# Patient Record
Sex: FEMALE | Race: BLACK OR AFRICAN AMERICAN | NOT HISPANIC OR LATINO
[De-identification: names, ages, dates, MRNs, and addresses within clinical notes are randomized per-mention and may not be internally consistent; named-entity substitution may affect disease eponyms.]

---

## 2017-04-28 ENCOUNTER — APPOINTMENT (OUTPATIENT)
Dept: INTERNAL MEDICINE | Facility: CLINIC | Age: 42
End: 2017-04-28

## 2017-04-28 VITALS
DIASTOLIC BLOOD PRESSURE: 90 MMHG | WEIGHT: 150 LBS | BODY MASS INDEX: 23.54 KG/M2 | OXYGEN SATURATION: 98 % | HEIGHT: 67 IN | SYSTOLIC BLOOD PRESSURE: 150 MMHG | TEMPERATURE: 97.8 F | HEART RATE: 91 BPM

## 2017-05-02 ENCOUNTER — MEDICATION RENEWAL (OUTPATIENT)
Age: 42
End: 2017-05-02

## 2017-05-04 LAB — HBA1C MFR BLD HPLC: 11.5 %

## 2017-05-26 ENCOUNTER — LABORATORY RESULT (OUTPATIENT)
Age: 42
End: 2017-05-26

## 2017-05-26 ENCOUNTER — APPOINTMENT (OUTPATIENT)
Dept: INTERNAL MEDICINE | Facility: CLINIC | Age: 42
End: 2017-05-26

## 2017-05-26 VITALS
HEART RATE: 110 BPM | WEIGHT: 150 LBS | BODY MASS INDEX: 23.54 KG/M2 | HEIGHT: 67 IN | DIASTOLIC BLOOD PRESSURE: 100 MMHG | TEMPERATURE: 98.7 F | SYSTOLIC BLOOD PRESSURE: 172 MMHG | OXYGEN SATURATION: 98 %

## 2017-05-26 VITALS — DIASTOLIC BLOOD PRESSURE: 92 MMHG | SYSTOLIC BLOOD PRESSURE: 160 MMHG

## 2017-05-26 DIAGNOSIS — R03.0 ELEVATED BLOOD-PRESSURE READING, W/OUT DIAGNOSIS OF HYPERTENSION: ICD-10-CM

## 2017-05-30 ENCOUNTER — CLINICAL ADVICE (OUTPATIENT)
Age: 42
End: 2017-05-30

## 2017-06-07 LAB
ALBUMIN SERPL ELPH-MCNC: 4.2 G/DL
ALP BLD-CCNC: 70 U/L
ALT SERPL-CCNC: 81 U/L
ANION GAP SERPL CALC-SCNC: 21 MMOL/L
AST SERPL-CCNC: 61 U/L
BASOPHILS # BLD AUTO: 0.02 K/UL
BASOPHILS NFR BLD AUTO: 0.3 %
BILIRUB SERPL-MCNC: 0.2 MG/DL
BUN SERPL-MCNC: 10 MG/DL
CALCIUM SERPL-MCNC: 9.8 MG/DL
CHLORIDE SERPL-SCNC: 93 MMOL/L
CHOLEST SERPL-MCNC: 492 MG/DL
CHOLEST/HDLC SERPL: 25.9 RATIO
CO2 SERPL-SCNC: 17 MMOL/L
CREAT SERPL-MCNC: 0.72 MG/DL
EOSINOPHIL # BLD AUTO: 0.12 K/UL
EOSINOPHIL NFR BLD AUTO: 1.9 %
GLUCOSE SERPL-MCNC: 132 MG/DL
HCT VFR BLD CALC: 36.6 %
HDLC SERPL-MCNC: 19 MG/DL
HGB BLD-MCNC: 12.7 G/DL
IMM GRANULOCYTES NFR BLD AUTO: 0.3 %
LDLC SERPL CALC-MCNC: NORMAL
LYMPHOCYTES # BLD AUTO: 2.2 K/UL
LYMPHOCYTES NFR BLD AUTO: 35.5 %
MAN DIFF?: NORMAL
MCHC RBC-ENTMCNC: 29.3 PG
MCHC RBC-ENTMCNC: 34.7 GM/DL
MCV RBC AUTO: 84.3 FL
MEV IGG FLD QL IA: >300 AU/ML
MEV IGG+IGM SER-IMP: POSITIVE
MONOCYTES # BLD AUTO: 0.45 K/UL
MONOCYTES NFR BLD AUTO: 7.3 %
MUV AB SER-ACNC: NORMAL
MUV IGG SER QL IA: 10.8 AU/ML
NEUTROPHILS # BLD AUTO: 3.39 K/UL
NEUTROPHILS NFR BLD AUTO: 54.7 %
PLATELET # BLD AUTO: 347 K/UL
POTASSIUM SERPL-SCNC: 3.6 MMOL/L
PROT SERPL-MCNC: 7.2 G/DL
RBC # BLD: 4.34 M/UL
RBC # FLD: 15.5 %
RUBV IGG FLD-ACNC: 5 INDEX
RUBV IGG SER-IMP: POSITIVE
SODIUM SERPL-SCNC: 131 MMOL/L
TRIGL SERPL-MCNC: 4650 MG/DL
TSH SERPL-ACNC: 2.98 UIU/ML
WBC # FLD AUTO: 6.2 K/UL

## 2017-06-16 ENCOUNTER — APPOINTMENT (OUTPATIENT)
Dept: INTERNAL MEDICINE | Facility: CLINIC | Age: 42
End: 2017-06-16

## 2017-06-16 VITALS
HEIGHT: 67 IN | BODY MASS INDEX: 23.54 KG/M2 | TEMPERATURE: 98.4 F | WEIGHT: 150 LBS | HEART RATE: 100 BPM | SYSTOLIC BLOOD PRESSURE: 142 MMHG | DIASTOLIC BLOOD PRESSURE: 90 MMHG | OXYGEN SATURATION: 98 %

## 2017-06-16 VITALS — DIASTOLIC BLOOD PRESSURE: 90 MMHG | SYSTOLIC BLOOD PRESSURE: 132 MMHG

## 2017-07-07 ENCOUNTER — RX RENEWAL (OUTPATIENT)
Age: 42
End: 2017-07-07

## 2017-07-07 ENCOUNTER — APPOINTMENT (OUTPATIENT)
Dept: INTERNAL MEDICINE | Facility: CLINIC | Age: 42
End: 2017-07-07

## 2017-07-07 VITALS — DIASTOLIC BLOOD PRESSURE: 80 MMHG | SYSTOLIC BLOOD PRESSURE: 122 MMHG

## 2017-07-07 VITALS
OXYGEN SATURATION: 98 % | WEIGHT: 149 LBS | HEIGHT: 67 IN | SYSTOLIC BLOOD PRESSURE: 130 MMHG | DIASTOLIC BLOOD PRESSURE: 90 MMHG | BODY MASS INDEX: 23.39 KG/M2 | TEMPERATURE: 98.7 F | HEART RATE: 97 BPM

## 2017-07-07 LAB
ANION GAP SERPL CALC-SCNC: 25 MMOL/L
BUN SERPL-MCNC: 16 MG/DL
CALCIUM SERPL-MCNC: 9.6 MG/DL
CHLORIDE SERPL-SCNC: 91 MMOL/L
CHOLEST SERPL-MCNC: 380 MG/DL
CHOLEST/HDLC SERPL: 29.2 RATIO
CO2 SERPL-SCNC: 14 MMOL/L
CREAT SERPL-MCNC: 0.64 MG/DL
GLUCOSE SERPL-MCNC: 297 MG/DL
HBA1C MFR BLD HPLC: 11 %
HDLC SERPL-MCNC: 13 MG/DL
LDLC SERPL CALC-MCNC: NORMAL
POTASSIUM SERPL-SCNC: 4.3 MMOL/L
SODIUM SERPL-SCNC: 130 MMOL/L
TRIGL SERPL-MCNC: 3830 MG/DL

## 2017-09-13 ENCOUNTER — MOBILE ON CALL (OUTPATIENT)
Age: 42
End: 2017-09-13

## 2017-10-07 ENCOUNTER — MOBILE ON CALL (OUTPATIENT)
Age: 42
End: 2017-10-07

## 2017-11-03 ENCOUNTER — APPOINTMENT (OUTPATIENT)
Dept: INTERNAL MEDICINE | Facility: CLINIC | Age: 42
End: 2017-11-03
Payer: COMMERCIAL

## 2017-11-03 VITALS
HEART RATE: 93 BPM | WEIGHT: 149 LBS | DIASTOLIC BLOOD PRESSURE: 83 MMHG | OXYGEN SATURATION: 98 % | SYSTOLIC BLOOD PRESSURE: 155 MMHG | BODY MASS INDEX: 23.39 KG/M2 | TEMPERATURE: 98.3 F | HEIGHT: 67 IN

## 2017-11-03 VITALS — DIASTOLIC BLOOD PRESSURE: 80 MMHG | SYSTOLIC BLOOD PRESSURE: 132 MMHG

## 2017-11-03 PROCEDURE — G0008: CPT

## 2017-11-03 PROCEDURE — 90686 IIV4 VACC NO PRSV 0.5 ML IM: CPT

## 2017-11-03 PROCEDURE — 99214 OFFICE O/P EST MOD 30 MIN: CPT | Mod: 25

## 2017-11-03 PROCEDURE — 36415 COLL VENOUS BLD VENIPUNCTURE: CPT

## 2017-11-03 RX ORDER — GLIPIZIDE 2.5 MG/1
2.5 TABLET, FILM COATED, EXTENDED RELEASE ORAL
Qty: 90 | Refills: 0 | Status: DISCONTINUED | COMMUNITY
Start: 2017-05-02

## 2017-11-03 RX ORDER — GLIPIZIDE 5 MG/1
5 TABLET, FILM COATED, EXTENDED RELEASE ORAL DAILY
Qty: 30 | Refills: 2 | Status: DISCONTINUED | COMMUNITY
Start: 2017-05-02 | End: 2017-11-03

## 2017-11-04 ENCOUNTER — MOBILE ON CALL (OUTPATIENT)
Age: 42
End: 2017-11-04

## 2017-11-08 LAB
25(OH)D3 SERPL-MCNC: 7.9 NG/ML
ANION GAP SERPL CALC-SCNC: 18 MMOL/L
BUN SERPL-MCNC: 13 MG/DL
CALCIUM SERPL-MCNC: 9.8 MG/DL
CHLORIDE SERPL-SCNC: 101 MMOL/L
CHOLEST SERPL-MCNC: 213 MG/DL
CHOLEST/HDLC SERPL: 6.5 RATIO
CO2 SERPL-SCNC: 20 MMOL/L
CREAT SERPL-MCNC: 0.65 MG/DL
GLUCOSE SERPL-MCNC: 226 MG/DL
HBA1C MFR BLD HPLC: 10.6 %
HDLC SERPL-MCNC: 33 MG/DL
LDLC SERPL CALC-MCNC: NORMAL
POTASSIUM SERPL-SCNC: 4.1 MMOL/L
SODIUM SERPL-SCNC: 139 MMOL/L
TRIGL SERPL-MCNC: 730 MG/DL

## 2018-03-02 ENCOUNTER — APPOINTMENT (OUTPATIENT)
Dept: INTERNAL MEDICINE | Facility: CLINIC | Age: 43
End: 2018-03-02

## 2018-03-26 ENCOUNTER — MEDICATION RENEWAL (OUTPATIENT)
Age: 43
End: 2018-03-26

## 2018-05-04 ENCOUNTER — APPOINTMENT (OUTPATIENT)
Dept: INTERNAL MEDICINE | Facility: CLINIC | Age: 43
End: 2018-05-04
Payer: SELF-PAY

## 2018-05-04 VITALS
WEIGHT: 145 LBS | HEIGHT: 67 IN | SYSTOLIC BLOOD PRESSURE: 140 MMHG | DIASTOLIC BLOOD PRESSURE: 90 MMHG | TEMPERATURE: 98.5 F | BODY MASS INDEX: 22.76 KG/M2 | OXYGEN SATURATION: 99 % | HEART RATE: 102 BPM

## 2018-05-04 VITALS — SYSTOLIC BLOOD PRESSURE: 148 MMHG | DIASTOLIC BLOOD PRESSURE: 80 MMHG

## 2018-05-04 PROCEDURE — 36415 COLL VENOUS BLD VENIPUNCTURE: CPT

## 2018-05-04 PROCEDURE — 99396 PREV VISIT EST AGE 40-64: CPT | Mod: 25

## 2018-05-04 PROCEDURE — 82962 GLUCOSE BLOOD TEST: CPT

## 2018-05-04 PROCEDURE — 93000 ELECTROCARDIOGRAM COMPLETE: CPT

## 2018-05-06 ENCOUNTER — MOBILE ON CALL (OUTPATIENT)
Age: 43
End: 2018-05-06

## 2018-05-11 ENCOUNTER — RX RENEWAL (OUTPATIENT)
Age: 43
End: 2018-05-11

## 2018-05-11 ENCOUNTER — MEDICATION RENEWAL (OUTPATIENT)
Age: 43
End: 2018-05-11

## 2018-06-07 LAB
25(OH)D3 SERPL-MCNC: 7.6 NG/ML
ALBUMIN SERPL ELPH-MCNC: 4.3 G/DL
ALP BLD-CCNC: 71 U/L
ALT SERPL-CCNC: 30 U/L
ANION GAP SERPL CALC-SCNC: 24 MMOL/L
AST SERPL-CCNC: 40 U/L
BASOPHILS # BLD AUTO: 0.01 K/UL
BASOPHILS NFR BLD AUTO: 0.2 %
BILIRUB SERPL-MCNC: 0.4 MG/DL
BUN SERPL-MCNC: 13 MG/DL
CALCIUM SERPL-MCNC: 9.2 MG/DL
CHLORIDE SERPL-SCNC: 97 MMOL/L
CHOLEST SERPL-MCNC: 206 MG/DL
CHOLEST/HDLC SERPL: 6.2 RATIO
CO2 SERPL-SCNC: 14 MMOL/L
CREAT SERPL-MCNC: 0.53 MG/DL
EOSINOPHIL # BLD AUTO: 0.08 K/UL
EOSINOPHIL NFR BLD AUTO: 1.4 %
GLUCOSE SERPL-MCNC: 136 MG/DL
HCT VFR BLD CALC: 38.3 %
HDLC SERPL-MCNC: 33 MG/DL
HGB BLD-MCNC: 12.3 G/DL
IMM GRANULOCYTES NFR BLD AUTO: 0.2 %
LDLC SERPL CALC-MCNC: NORMAL
LYMPHOCYTES # BLD AUTO: 2.37 K/UL
LYMPHOCYTES NFR BLD AUTO: 41.8 %
MAN DIFF?: NORMAL
MCHC RBC-ENTMCNC: 27 PG
MCHC RBC-ENTMCNC: 32.1 GM/DL
MCV RBC AUTO: 84 FL
MONOCYTES # BLD AUTO: 0.44 K/UL
MONOCYTES NFR BLD AUTO: 7.8 %
NEUTROPHILS # BLD AUTO: 2.76 K/UL
NEUTROPHILS NFR BLD AUTO: 48.6 %
PLATELET # BLD AUTO: 315 K/UL
POTASSIUM SERPL-SCNC: 4.4 MMOL/L
PROT SERPL-MCNC: 7.9 G/DL
RBC # BLD: 4.56 M/UL
RBC # FLD: 15.3 %
SODIUM SERPL-SCNC: 135 MMOL/L
TRIGL SERPL-MCNC: 642 MG/DL
TSH SERPL-ACNC: 2.46 UIU/ML
TTG IGA SER IA-ACNC: 6.2 UNITS
TTG IGA SER-ACNC: NEGATIVE
WBC # FLD AUTO: 5.67 K/UL

## 2018-09-21 ENCOUNTER — RX RENEWAL (OUTPATIENT)
Age: 43
End: 2018-09-21

## 2018-09-21 ENCOUNTER — APPOINTMENT (OUTPATIENT)
Dept: INTERNAL MEDICINE | Facility: CLINIC | Age: 43
End: 2018-09-21

## 2018-09-21 ENCOUNTER — OTHER (OUTPATIENT)
Age: 43
End: 2018-09-21

## 2018-09-21 ENCOUNTER — MEDICATION RENEWAL (OUTPATIENT)
Age: 43
End: 2018-09-21

## 2019-03-22 ENCOUNTER — MEDICATION RENEWAL (OUTPATIENT)
Age: 44
End: 2019-03-22

## 2019-12-06 ENCOUNTER — APPOINTMENT (OUTPATIENT)
Dept: INTERNAL MEDICINE | Facility: CLINIC | Age: 44
End: 2019-12-06

## 2020-07-10 ENCOUNTER — RX RENEWAL (OUTPATIENT)
Age: 45
End: 2020-07-10

## 2020-07-10 ENCOUNTER — NON-APPOINTMENT (OUTPATIENT)
Age: 45
End: 2020-07-10

## 2020-07-10 ENCOUNTER — APPOINTMENT (OUTPATIENT)
Dept: INTERNAL MEDICINE | Facility: CLINIC | Age: 45
End: 2020-07-10
Payer: COMMERCIAL

## 2020-07-10 VITALS
WEIGHT: 124 LBS | HEART RATE: 113 BPM | DIASTOLIC BLOOD PRESSURE: 80 MMHG | TEMPERATURE: 97.9 F | HEIGHT: 64 IN | OXYGEN SATURATION: 98 % | SYSTOLIC BLOOD PRESSURE: 150 MMHG | BODY MASS INDEX: 21.17 KG/M2

## 2020-07-10 VITALS — SYSTOLIC BLOOD PRESSURE: 130 MMHG | DIASTOLIC BLOOD PRESSURE: 80 MMHG

## 2020-07-10 DIAGNOSIS — H00.019 HORDEOLUM EXTERNUM UNSPECIFIED EYE, UNSPECIFIED EYELID: ICD-10-CM

## 2020-07-10 DIAGNOSIS — Z3A.15 15 WEEKS GESTATION OF PREGNANCY: ICD-10-CM

## 2020-07-10 DIAGNOSIS — H61.23 IMPACTED CERUMEN, BILATERAL: ICD-10-CM

## 2020-07-10 DIAGNOSIS — Z11.59 ENCOUNTER FOR SCREENING FOR OTHER VIRAL DISEASES: ICD-10-CM

## 2020-07-10 DIAGNOSIS — Z87.898 PERSONAL HISTORY OF OTHER SPECIFIED CONDITIONS: ICD-10-CM

## 2020-07-10 DIAGNOSIS — E55.9 VITAMIN D DEFICIENCY, UNSPECIFIED: ICD-10-CM

## 2020-07-10 PROCEDURE — 93000 ELECTROCARDIOGRAM COMPLETE: CPT

## 2020-07-10 PROCEDURE — 99396 PREV VISIT EST AGE 40-64: CPT | Mod: 25

## 2020-07-10 PROCEDURE — 36415 COLL VENOUS BLD VENIPUNCTURE: CPT

## 2020-07-10 RX ORDER — FLASH GLUCOSE SCANNING READER
EACH MISCELLANEOUS
Qty: 1 | Refills: 0 | Status: ACTIVE | COMMUNITY
Start: 2020-07-10 | End: 1900-01-01

## 2020-07-10 RX ORDER — FLASH GLUCOSE SENSOR
KIT MISCELLANEOUS
Qty: 2 | Refills: 6 | Status: ACTIVE | COMMUNITY
Start: 2020-07-10 | End: 1900-01-01

## 2020-07-10 NOTE — PAST MEDICAL HISTORY
[Menstruating] : menstruating [Regular Cycle Intervals] : have been regular [AB Spont ___] : miscarriages: [unfilled]  [Total Preg ___] : G[unfilled]

## 2020-07-10 NOTE — HEALTH RISK ASSESSMENT
[Good] : ~his/her~ current health as good [] : No [No] : No [One fall no injury in past year] : Patient reported one fall in the past year without injury [Audit-CScore] : 0 [0] : 2) Feeling down, depressed, or hopeless: Not at all (0) [de-identified] : Walking [de-identified] : Appetite secondary to pain [[id number]] : 0 [Language] : denies difficulty with language [Change in mental status noted] : No change in mental status noted [Patient reported bone density results were normal] : Patient reported bone density results were normal [Handling Complex Tasks] : denies difficulty handling complex tasks [Learning/Retaining New Information] : denies difficulty learning/retaining new information [Behavior] : denies difficulty with behavior [Spatial Ability and Orientation] : denies difficulty with spatial ability and orientation [Reasoning] : denies difficulty with reasoning [None] : None [Employed] : employed [Single] : single [Alone] : lives alone [High Risk Behavior] : no high risk behavior [Feels Safe at Home] : Feels safe at home [Sexually Active] : not sexually active [Fully functional (using the telephone, shopping, preparing meals, housekeeping, doing laundry, using] : Fully functional and needs no help or supervision to perform IADLs (using the telephone, shopping, preparing meals, housekeeping, doing laundry, using transportation, managing medications and managing finances) [Fully functional (bathing, dressing, toileting, transferring, walking, feeding)] : Fully functional (bathing, dressing, toileting, transferring, walking, feeding) [Reports normal functional visual acuity (ie: able to read med bottle)] : Reports normal functional visual acuity [Reports changes in hearing] : Reports no changes in hearing [Reports changes in vision] : Reports no changes in vision [Carbon Monoxide Detector] : carbon monoxide detector [Smoke Detector] : smoke detector [Reports changes in dental health] : Reports no changes in dental health [Guns at Home] : no guns at home [Safety elements used in home] : safety elements used in home [Seat Belt] :  uses seat belt [Travel to Developing Areas] : does not  travel to developing areas [Sunscreen] : uses sunscreen [TB Exposure] : is not being exposed to tuberculosis [PapSmearDate] : 2018 [Caregiver Concerns] : does not have caregiver concerns

## 2020-07-10 NOTE — PHYSICAL EXAM
[Normal Outer Ear/Nose] : the outer ears and nose were normal in appearance [Normal Sclera/Conjunctiva] : normal sclera/conjunctiva [No Joint Swelling] : no joint swelling [Grossly Normal Strength/Tone] : grossly normal strength/tone [de-identified] :  exophthalmia [Normal] : normal gait, coordination grossly intact, no focal deficits and deep tendon reflexes were 2+ and symmetric [de-identified] : Strength 4 to 5 on the right sensory grossly intact positive straight leg raise on the right

## 2020-07-10 NOTE — ASSESSMENT
[FreeTextEntry1] : Patient is a 44-year-old female with history of type II diabetes, hypertension, exophthalmia, nonalcoholic fatty liver, thyroid nodules, thyroid medically who presents for comprehensive annual physical examination complains of weight loss and severe lower back pain\par \par 1 chronic lower back pain with right sided sciatic\par increase Advil 21 tablet three times a day and started at gabapentin 300 MG QHS then taper up to if no sedation Tid.\par Schedule MRI\par consider referral to back surgery\par \par 2. Weight loss\par patient claim secondary to pain consider CT abdomen and pelvis chest\par start with mammogram labs.\par \par 3 type II diabetes\par continue metformin and glipizide check hemoglobin A1c kidney function\par \par 4. Hypertriglyceridemia\par continue medication check lipid profile\par \par 5 sinus tachycardia\par secondary to pain check TSH LVH on EKG\par \par 6 hypertension\par continue amlodipine 5 mg, hydrochlorothiazide 12.5 mg Q day\par \par 7 vitamin D deficiency\par continue vitamin D as needed today check vitamin D level\par \par 8 health maintenance\par referral to ophthalmology\par referral for mammogram\par check COVID antibody\par check routine labs\par \par 9. Nonalcoholic fatty liver\par check LFTs\par \par

## 2020-07-10 NOTE — REVIEW OF SYSTEMS
[Joint Swelling] : no joint swelling [Joint Pain] : no joint pain [Joint Stiffness] : no joint stiffness [Muscle Pain] : no muscle pain [Muscle Weakness] : muscle weakness [Back Pain] : back pain [Negative] : Heme/Lymph

## 2020-07-10 NOTE — HISTORY OF PRESENT ILLNESS
[FreeTextEntry1] : Comprehensive annual physical examination follow-up for diabetes and acute on chronic right sided low back pain weight loss [de-identified] : Patient is a 44-year-old -American female with history of type II diabetes, hypertension, vitamin D disease, nonalcoholic fatty liver disease, thyroid nodules, hypertriglyceridemia lower back pain who presents for comprehensive annual physical examination. Patient complains of severe right sided lower back pain radiating down right leg with weakness patient finds minimal relief with Advil one tablet twice a day denies incontinence, saddle numbness, incontinence. Patient notes well-controlled sugars denies headache, dizziness, chest pain.

## 2020-07-17 LAB
25(OH)D3 SERPL-MCNC: 25.6 NG/ML
ALBUMIN SERPL ELPH-MCNC: 4.7 G/DL
ALP BLD-CCNC: 59 U/L
ALT SERPL-CCNC: 35 U/L
ANION GAP SERPL CALC-SCNC: 21 MMOL/L
AST SERPL-CCNC: 28 U/L
BASOPHILS # BLD AUTO: 0.02 K/UL
BASOPHILS NFR BLD AUTO: 0.3 %
BILIRUB SERPL-MCNC: 0.4 MG/DL
BUN SERPL-MCNC: 23 MG/DL
CALCIUM SERPL-MCNC: 10.2 MG/DL
CHLORIDE SERPL-SCNC: 99 MMOL/L
CHOLEST SERPL-MCNC: 257 MG/DL
CHOLEST/HDLC SERPL: 7 RATIO
CO2 SERPL-SCNC: 20 MMOL/L
CREAT SERPL-MCNC: 1.05 MG/DL
EOSINOPHIL # BLD AUTO: 0.04 K/UL
EOSINOPHIL NFR BLD AUTO: 0.7 %
ESTIMATED AVERAGE GLUCOSE: 200 MG/DL
GLUCOSE SERPL-MCNC: 110 MG/DL
HBA1C MFR BLD HPLC: 8.6 %
HCT VFR BLD CALC: 39.7 %
HDLC SERPL-MCNC: 37 MG/DL
HGB BLD-MCNC: 12.7 G/DL
IMM GRANULOCYTES NFR BLD AUTO: 0.2 %
LDLC SERPL CALC-MCNC: 184 MG/DL
LYMPHOCYTES # BLD AUTO: 3.19 K/UL
LYMPHOCYTES NFR BLD AUTO: 53.4 %
MAN DIFF?: NORMAL
MCHC RBC-ENTMCNC: 27 PG
MCHC RBC-ENTMCNC: 32 GM/DL
MCV RBC AUTO: 84.5 FL
MONOCYTES # BLD AUTO: 0.43 K/UL
MONOCYTES NFR BLD AUTO: 7.2 %
NEUTROPHILS # BLD AUTO: 2.28 K/UL
NEUTROPHILS NFR BLD AUTO: 38.2 %
PLATELET # BLD AUTO: 280 K/UL
POTASSIUM SERPL-SCNC: 3.9 MMOL/L
PROT SERPL-MCNC: 7.4 G/DL
RBC # BLD: 4.7 M/UL
RBC # FLD: 15.4 %
SARS-COV-2 IGG SERPL IA-ACNC: 0.01 INDEX
SARS-COV-2 IGG SERPL QL IA: NEGATIVE
SODIUM SERPL-SCNC: 140 MMOL/L
TRIGL SERPL-MCNC: 186 MG/DL
TSH SERPL-ACNC: 2.22 UIU/ML
WBC # FLD AUTO: 5.97 K/UL

## 2020-07-24 ENCOUNTER — RX RENEWAL (OUTPATIENT)
Age: 45
End: 2020-07-24

## 2020-07-24 ENCOUNTER — APPOINTMENT (OUTPATIENT)
Dept: INTERNAL MEDICINE | Facility: CLINIC | Age: 45
End: 2020-07-24
Payer: COMMERCIAL

## 2020-07-24 VITALS
DIASTOLIC BLOOD PRESSURE: 82 MMHG | OXYGEN SATURATION: 100 % | HEART RATE: 107 BPM | SYSTOLIC BLOOD PRESSURE: 142 MMHG | TEMPERATURE: 98.2 F | HEIGHT: 64 IN | WEIGHT: 121.6 LBS | BODY MASS INDEX: 20.76 KG/M2

## 2020-07-24 VITALS — SYSTOLIC BLOOD PRESSURE: 130 MMHG | DIASTOLIC BLOOD PRESSURE: 80 MMHG

## 2020-07-24 PROCEDURE — 82962 GLUCOSE BLOOD TEST: CPT | Mod: NC

## 2020-07-24 PROCEDURE — 99214 OFFICE O/P EST MOD 30 MIN: CPT | Mod: 25

## 2020-07-24 NOTE — ASSESSMENT
[FreeTextEntry1] : Patient is a 44-year-old female with history of type II diabetes, hypertension, hyperlipidemia, chronic lower back pain, fatty liver, Exophthalmia thyroid nodules who presents for follow-up of type II diabetes\par \par 1. Type II diabetes\par continue metformin thousand BID and glipizide XL one tablet 5 mg Q day\par random fingerstick 97\par hemoglobin A1c 8.6\par observe for now\par \par 2 hyperlipidemia\par \par start Crestor 5 mg continue fenofibrate 145 one tablet Q day\par counseled on diet\par \par 3 back pain\par improved with gabapentin 300 mg QHS\par await MRI\par \par 4 follow-up in six weeks

## 2020-07-24 NOTE — HISTORY OF PRESENT ILLNESS
[FreeTextEntry1] : Follow-up for type II diabetes hyperlipidemia [de-identified] : Patient is a 44-year-old female with history of type II diabetes  exophthalmia, hypertension hypertriglyceridemia nonalcoholic fatty liver disease secondary amenorrhea vitamin D deficiency who presents for follow-up patient feels well complains of lower back pain denies chest pain, shortness of breath distant exertion palpitation lightheadedness dizziness

## 2020-09-11 ENCOUNTER — RX RENEWAL (OUTPATIENT)
Age: 45
End: 2020-09-11

## 2020-09-11 ENCOUNTER — APPOINTMENT (OUTPATIENT)
Dept: INTERNAL MEDICINE | Facility: CLINIC | Age: 45
End: 2020-09-11
Payer: COMMERCIAL

## 2020-09-11 VITALS
HEIGHT: 64 IN | DIASTOLIC BLOOD PRESSURE: 72 MMHG | SYSTOLIC BLOOD PRESSURE: 145 MMHG | WEIGHT: 115 LBS | HEART RATE: 129 BPM | BODY MASS INDEX: 19.63 KG/M2 | OXYGEN SATURATION: 98 % | TEMPERATURE: 97.1 F

## 2020-09-11 VITALS — DIASTOLIC BLOOD PRESSURE: 80 MMHG | SYSTOLIC BLOOD PRESSURE: 132 MMHG

## 2020-09-11 DIAGNOSIS — Z23 ENCOUNTER FOR IMMUNIZATION: ICD-10-CM

## 2020-09-11 PROCEDURE — 90686 IIV4 VACC NO PRSV 0.5 ML IM: CPT

## 2020-09-11 PROCEDURE — G0008: CPT

## 2020-09-11 PROCEDURE — 99214 OFFICE O/P EST MOD 30 MIN: CPT | Mod: 25

## 2020-09-11 PROCEDURE — 82962 GLUCOSE BLOOD TEST: CPT | Mod: NC

## 2020-09-11 NOTE — PHYSICAL EXAM
[Normal Outer Ear/Nose] : the outer ears and nose were normal in appearance [Normal] : normal rate, regular rhythm, normal S1 and S2 and no murmur heard [de-identified] : exophthalmia

## 2020-09-11 NOTE — ASSESSMENT
[FreeTextEntry1] : Patient is a 44-year-old female with history of hypertension type II diabetes hypertriglyceridemia fatty liver thyroid medically vitamin D deficiency who presents for follow-up\par \par 1.. Sinus tachycardia\par give trial of metoprolol ER 25 mg consider echocardiogram normal sinus tachycardia with LVH on EKG\par observe PFTs normal\par \par 2. Type II diabetes\par random fingerstick 136\par continue metformin and glipizide XL 5 mg await hemoglobin A1c if elevated increase medication counseled on diet\par \par 3.. Hyperlipidemia hypertriglyceridemia\par increase Crestor 10 mg a day continue fenofibrozil 145 mg a day\par check lipid profile next visit\par \par 4. Health maintenance\par flu shot today\par check hemoglobin A1c basic metabolic panel\par follow-up in four weeks

## 2020-09-11 NOTE — HISTORY OF PRESENT ILLNESS
[FreeTextEntry1] : Follow-up for type II diabetes tachycardia and hypertension [de-identified] : Patient is a 44-year-old female with history of type II diabetes, hypertension, hyperlipidemia/hypertriglyceridemia, Exophthalmia multi-nodule goiter, fatty liver vitamin D deficiency who presents for follow-up patient feels well complains of ankle pain see an orthopedist denies chest pain, shortness of breath distant exertion palpitations patient's heart rate around 128 patient stressed but doesn't feel lightheaded dizzy

## 2020-09-16 DIAGNOSIS — G89.29 LUMBAGO WITH SCIATICA, RIGHT SIDE: ICD-10-CM

## 2020-09-16 DIAGNOSIS — Z87.39 PERSONAL HISTORY OF OTHER DISEASES OF THE MUSCULOSKELETAL SYSTEM AND CONNECTIVE TISSUE: ICD-10-CM

## 2020-09-16 DIAGNOSIS — M54.41 LUMBAGO WITH SCIATICA, RIGHT SIDE: ICD-10-CM

## 2020-09-16 DIAGNOSIS — G89.29 PERSONAL HISTORY OF OTHER DISEASES OF THE MUSCULOSKELETAL SYSTEM AND CONNECTIVE TISSUE: ICD-10-CM

## 2020-09-16 LAB
ANION GAP SERPL CALC-SCNC: 15 MMOL/L
BUN SERPL-MCNC: 19 MG/DL
CALCIUM SERPL-MCNC: 10.4 MG/DL
CHLORIDE SERPL-SCNC: 100 MMOL/L
CO2 SERPL-SCNC: 23 MMOL/L
CREAT SERPL-MCNC: 0.87 MG/DL
ESTIMATED AVERAGE GLUCOSE: 131 MG/DL
GLUCOSE SERPL-MCNC: 118 MG/DL
HBA1C MFR BLD HPLC: 6.2 %
POTASSIUM SERPL-SCNC: 4.7 MMOL/L
SODIUM SERPL-SCNC: 139 MMOL/L

## 2020-10-06 ENCOUNTER — RX RENEWAL (OUTPATIENT)
Age: 45
End: 2020-10-06

## 2020-10-09 ENCOUNTER — APPOINTMENT (OUTPATIENT)
Dept: INTERNAL MEDICINE | Facility: CLINIC | Age: 45
End: 2020-10-09
Payer: COMMERCIAL

## 2020-10-09 VITALS
HEART RATE: 132 BPM | DIASTOLIC BLOOD PRESSURE: 75 MMHG | TEMPERATURE: 97.5 F | WEIGHT: 112 LBS | OXYGEN SATURATION: 99 % | HEIGHT: 64 IN | BODY MASS INDEX: 19.12 KG/M2 | SYSTOLIC BLOOD PRESSURE: 142 MMHG

## 2020-10-09 PROCEDURE — 36415 COLL VENOUS BLD VENIPUNCTURE: CPT

## 2020-10-09 PROCEDURE — 99214 OFFICE O/P EST MOD 30 MIN: CPT | Mod: 25

## 2020-10-09 PROCEDURE — 82962 GLUCOSE BLOOD TEST: CPT | Mod: NC

## 2020-10-09 RX ORDER — METOPROLOL SUCCINATE 25 MG/1
25 TABLET, EXTENDED RELEASE ORAL
Qty: 90 | Refills: 2 | Status: DISCONTINUED | COMMUNITY
Start: 2020-09-11 | End: 2020-10-09

## 2020-10-09 RX ORDER — GLIPIZIDE 5 MG/1
5 TABLET, FILM COATED, EXTENDED RELEASE ORAL
Qty: 180 | Refills: 0 | Status: DISCONTINUED | COMMUNITY
Start: 2017-11-03 | End: 2020-10-09

## 2020-10-09 NOTE — ASSESSMENT
[FreeTextEntry1] : Patient is a 44-year-old female with history of type II diabetes hyperlipidemia, nonalcoholic fatty liver hypertension and tachycardia who presents for follow-up\par \par 1 sinus tachycardia\par pulse today 122\par we check electrolytes unable to tolerate beta-blocker\par maybe partly due to hypoglycemia and stress\par observe for now\par \par 2. Type II diabetes\par random fingerstick 94\par DC glipizide continue metformin thousand BID\par last hemoglobin A1c 6.2\par \par 3. Hypertension\par controlled on medication\par

## 2020-10-09 NOTE — HISTORY OF PRESENT ILLNESS
[FreeTextEntry1] : Follow-up for tachycardia type II diabetes [de-identified] : The patient is a 44-year-old female with history of type II diabetes hypertension, hypertriglyceridemia, nonalcoholic fatty liver, vitamin D deficiency, thyroid medically who presents for follow-up patient unable to tolerate beta-blocker felt lightheaded dizzy sugars well-controlled decrease glipizide to 5 mg a day now presents for follow-up pulse 122 patient feels well denies dizziness lightheadedness chest pain palpitation diaphoresis random fingerstick 94

## 2020-10-12 LAB
ANION GAP SERPL CALC-SCNC: 13 MMOL/L
BUN SERPL-MCNC: 16 MG/DL
CALCIUM SERPL-MCNC: 9.9 MG/DL
CHLORIDE SERPL-SCNC: 107 MMOL/L
CHOLEST SERPL-MCNC: 209 MG/DL
CHOLEST/HDLC SERPL: 4.8 RATIO
CO2 SERPL-SCNC: 23 MMOL/L
CREAT SERPL-MCNC: 0.62 MG/DL
ESTIMATED AVERAGE GLUCOSE: 117 MG/DL
GLUCOSE SERPL-MCNC: 88 MG/DL
HBA1C MFR BLD HPLC: 5.7 %
HDLC SERPL-MCNC: 44 MG/DL
LDLC SERPL CALC-MCNC: 136 MG/DL
POTASSIUM SERPL-SCNC: 4.1 MMOL/L
SODIUM SERPL-SCNC: 142 MMOL/L
TRIGL SERPL-MCNC: 147 MG/DL

## 2020-12-04 ENCOUNTER — APPOINTMENT (OUTPATIENT)
Dept: INTERNAL MEDICINE | Facility: CLINIC | Age: 45
End: 2020-12-04
Payer: COMMERCIAL

## 2020-12-04 VITALS
HEIGHT: 64 IN | TEMPERATURE: 99 F | WEIGHT: 112 LBS | SYSTOLIC BLOOD PRESSURE: 142 MMHG | OXYGEN SATURATION: 100 % | HEART RATE: 116 BPM | DIASTOLIC BLOOD PRESSURE: 87 MMHG | BODY MASS INDEX: 19.12 KG/M2

## 2020-12-04 VITALS — HEART RATE: 108 BPM

## 2020-12-04 DIAGNOSIS — R00.0 TACHYCARDIA, UNSPECIFIED: ICD-10-CM

## 2020-12-04 DIAGNOSIS — Z87.898 PERSONAL HISTORY OF OTHER SPECIFIED CONDITIONS: ICD-10-CM

## 2020-12-04 DIAGNOSIS — J30.9 ALLERGIC RHINITIS, UNSPECIFIED: ICD-10-CM

## 2020-12-04 PROCEDURE — 99072 ADDL SUPL MATRL&STAF TM PHE: CPT

## 2020-12-04 PROCEDURE — 99214 OFFICE O/P EST MOD 30 MIN: CPT | Mod: 25

## 2020-12-04 PROCEDURE — 36415 COLL VENOUS BLD VENIPUNCTURE: CPT

## 2020-12-04 RX ORDER — GABAPENTIN 300 MG/1
300 CAPSULE ORAL
Qty: 45 | Refills: 1 | Status: DISCONTINUED | COMMUNITY
Start: 2020-07-10 | End: 2020-12-04

## 2020-12-04 NOTE — ASSESSMENT
[FreeTextEntry1] : Patient is a 45-year-old female with history of exophthalmia, type II diabetes, hypertension, hypertriglyceridemia chronic lower back pain ankle pain who presents for follow-up\par \par 1. Type II diabetes\par less hemoglobin A1c 6.3\par continue metformin thousand BID\par diet\par check hemoglobin A1c basic metabolic panel\par \par 2. Allergic rhinitis\par symptoms consistent with her allergies usually however will check COVID antibody\par if symptoms worsen consider COVID-19 screen\par \par Number three... Hypertension\par borderline control continue amlodipine 5 mg and hydrochlorothiazide 12.5\par \par 4 tachycardia\par etiology unclear normal thyroid functions not anemic may be secondary to nerves\par consider switching amlodipine\par consider adding beta-blocker will observe patient moderate at home\par \par  5. Hypertriglyceridemia\par continue Crestor and fenofibrate check lipid profile\par follow-up in three months.

## 2020-12-04 NOTE — REVIEW OF SYSTEMS
[Itching] : itching [Nasal Discharge] : nasal discharge [Negative] : Psychiatric [Discharge] : no discharge [Pain] : no pain [Vision Problems] : no vision problems [Earache] : no earache [Hearing Loss] : no hearing loss [Nosebleed] : no nosebleeds [Hoarseness] : no hoarseness [Sore Throat] : no sore throat [Postnasal Drip] : no postnasal drip

## 2020-12-04 NOTE — HISTORY OF PRESENT ILLNESS
[FreeTextEntry1] : Follow-up for type II diabetes [de-identified] : The patient is a 45-year-old female with history of excessive apnea, type II diabetes hypertension hypertriglyceridemia tachycardia multi-thyroid nodules, chronic lower back pain who presents for follow-up patient feels well complains of occasional aches and pains denies chest pain, shortness of breath polyuria polydipsia recent allergy like symptoms itchy eyes nasal congestion denies fever, chills, loss of smell or taste, sore throat, diarrhea

## 2020-12-08 LAB
ANION GAP SERPL CALC-SCNC: 17 MMOL/L
BUN SERPL-MCNC: 16 MG/DL
CALCIUM SERPL-MCNC: 10.2 MG/DL
CHLORIDE SERPL-SCNC: 105 MMOL/L
CHOLEST SERPL-MCNC: 211 MG/DL
CO2 SERPL-SCNC: 16 MMOL/L
CREAT SERPL-MCNC: 0.55 MG/DL
ESTIMATED AVERAGE GLUCOSE: 120 MG/DL
GLUCOSE SERPL-MCNC: 65 MG/DL
HBA1C MFR BLD HPLC: 5.8 %
HDLC SERPL-MCNC: 47 MG/DL
LDLC SERPL CALC-MCNC: 133 MG/DL
NONHDLC SERPL-MCNC: 165 MG/DL
POTASSIUM SERPL-SCNC: 4.4 MMOL/L
SARS-COV-2 IGG SERPL IA-ACNC: 0.08 INDEX
SARS-COV-2 IGG SERPL QL IA: NEGATIVE
SODIUM SERPL-SCNC: 139 MMOL/L
TRIGL SERPL-MCNC: 160 MG/DL

## 2021-05-07 ENCOUNTER — APPOINTMENT (OUTPATIENT)
Dept: INTERNAL MEDICINE | Facility: CLINIC | Age: 46
End: 2021-05-07
Payer: COMMERCIAL

## 2021-05-07 VITALS
SYSTOLIC BLOOD PRESSURE: 154 MMHG | HEART RATE: 109 BPM | WEIGHT: 128 LBS | TEMPERATURE: 98 F | OXYGEN SATURATION: 97 % | DIASTOLIC BLOOD PRESSURE: 85 MMHG | HEIGHT: 64 IN | BODY MASS INDEX: 21.85 KG/M2

## 2021-05-07 VITALS — DIASTOLIC BLOOD PRESSURE: 72 MMHG | SYSTOLIC BLOOD PRESSURE: 132 MMHG

## 2021-05-07 PROCEDURE — 81003 URINALYSIS AUTO W/O SCOPE: CPT | Mod: NC,QW

## 2021-05-07 PROCEDURE — 36415 COLL VENOUS BLD VENIPUNCTURE: CPT

## 2021-05-07 PROCEDURE — 99214 OFFICE O/P EST MOD 30 MIN: CPT | Mod: 25

## 2021-05-07 PROCEDURE — 99072 ADDL SUPL MATRL&STAF TM PHE: CPT

## 2021-05-07 PROCEDURE — 82962 GLUCOSE BLOOD TEST: CPT | Mod: NC

## 2021-05-07 NOTE — REVIEW OF SYSTEMS
[Back Pain] : back pain [Negative] : Psychiatric [Joint Pain] : no joint pain [Joint Stiffness] : no joint stiffness [Joint Swelling] : no joint swelling [Muscle Weakness] : no muscle weakness [Muscle Pain] : no muscle pain

## 2021-05-07 NOTE — ASSESSMENT
[FreeTextEntry1] : Patient is a 45-year-old female with history of exophthalmia, type II diabetes hypertension hyperlipidemia multi-thyroid nodules, fatty liver vitamin D deficiency who presents for follow-up of multiple medical issues and complaint of right sided back pain\par \par 1 right sided back pain\par most likely musculoskeletal urine dip only trace RBCs, protein\par Tylenol for now if persist check sonogram rule out kidney stone\par \par 2. Type II diabetes\par random fingerstick 112\par continue metformin thousand BID\par counseled on diet\par check hemoglobin A1c \par routine ophthalmologic referral\par \par 3 hyperlipidemia\par continue Crestor and fenofibrate\par check lipid profile\par \par 4. Hypertension\par continue amlodipine five and hydrochlorothiazide 12.5\par normotensive\par \par 5. health maintenance\par discuss covert vaccine\par schedule CPE\par \par 6. Fatty liver\par check LFTs consider repeating sonogram

## 2021-05-07 NOTE — HISTORY OF PRESENT ILLNESS
[FreeTextEntry1] : Follow-up for hypertension type II diabetes hyperlipidemia [de-identified] : Patient is a 45-year-old female with history of exophthalmia, type II diabetes, hypertension, hyperlipidemia, thyroid nodules, chronic low back pain, left ankle injury, thyroid medically, vitamin D deficiency and fatty liver disease who presents for follow-up. Patient complains of stress from work when the lower take a day off to come see the doctor. Also complains of right sided lower back pain radiating anteriorly associated with movement no dysuria no hematuria. Otherwise patient feels well no chest pain shortness of breath polyuria polydipsia headache dizziness lightheadedness patient has not received COVID-19 vaccine

## 2021-05-07 NOTE — PHYSICAL EXAM
[Normal Sclera/Conjunctiva] : normal sclera/conjunctiva [Normal Supraclavicular Nodes] : no supraclavicular lymphadenopathy [Normal Posterior Cervical Nodes] : no posterior cervical lymphadenopathy [Normal Anterior Cervical Nodes] : no anterior cervical lymphadenopathy [Normal] : no CVA or spinal tenderness [de-identified] : Exophthalmia

## 2021-05-18 LAB
ALBUMIN SERPL ELPH-MCNC: 5.1 G/DL
ALP BLD-CCNC: 72 U/L
ALT SERPL-CCNC: 22 U/L
ANION GAP SERPL CALC-SCNC: 14 MMOL/L
AST SERPL-CCNC: 19 U/L
BASOPHILS # BLD AUTO: 0.02 K/UL
BASOPHILS NFR BLD AUTO: 0.3 %
BILIRUB SERPL-MCNC: 0.2 MG/DL
BUN SERPL-MCNC: 20 MG/DL
CALCIUM SERPL-MCNC: 10.1 MG/DL
CHLORIDE SERPL-SCNC: 100 MMOL/L
CO2 SERPL-SCNC: 26 MMOL/L
CREAT SERPL-MCNC: 0.71 MG/DL
EOSINOPHIL # BLD AUTO: 0.08 K/UL
EOSINOPHIL NFR BLD AUTO: 1.2 %
ESTIMATED AVERAGE GLUCOSE: 151 MG/DL
HBA1C MFR BLD HPLC: 6.9 %
HCT VFR BLD CALC: 41.2 %
HGB BLD-MCNC: 12.4 G/DL
IMM GRANULOCYTES NFR BLD AUTO: 0.1 %
LYMPHOCYTES # BLD AUTO: 3.04 K/UL
LYMPHOCYTES NFR BLD AUTO: 44.8 %
MAN DIFF?: NORMAL
MCHC RBC-ENTMCNC: 26.6 PG
MCHC RBC-ENTMCNC: 30.1 GM/DL
MCV RBC AUTO: 88.4 FL
MONOCYTES # BLD AUTO: 0.46 K/UL
MONOCYTES NFR BLD AUTO: 6.8 %
NEUTROPHILS # BLD AUTO: 3.17 K/UL
NEUTROPHILS NFR BLD AUTO: 46.8 %
PLATELET # BLD AUTO: 320 K/UL
POTASSIUM SERPL-SCNC: 4.1 MMOL/L
PROT SERPL-MCNC: 8.1 G/DL
RBC # BLD: 4.66 M/UL
RBC # FLD: 16.5 %
SODIUM SERPL-SCNC: 139 MMOL/L
WBC # FLD AUTO: 6.78 K/UL

## 2021-06-04 LAB
BILIRUB UR QL STRIP: NEGATIVE
CLARITY UR: CLEAR
COLLECTION METHOD: NORMAL
GLUCOSE UR-MCNC: NEGATIVE
HCG UR QL: 0.2 EU/DL
HGB UR QL STRIP.AUTO: NORMAL
KETONES UR-MCNC: NEGATIVE
LEUKOCYTE ESTERASE UR QL STRIP: NEGATIVE
NITRITE UR QL STRIP: NEGATIVE
PH UR STRIP: 5.5
PROT UR STRIP-MCNC: NORMAL
SP GR UR STRIP: >=1.03

## 2021-08-13 ENCOUNTER — APPOINTMENT (OUTPATIENT)
Dept: INTERNAL MEDICINE | Facility: CLINIC | Age: 46
End: 2021-08-13
Payer: COMMERCIAL

## 2021-08-13 VITALS
HEIGHT: 64 IN | SYSTOLIC BLOOD PRESSURE: 144 MMHG | OXYGEN SATURATION: 100 % | DIASTOLIC BLOOD PRESSURE: 82 MMHG | WEIGHT: 125 LBS | HEART RATE: 108 BPM | TEMPERATURE: 97.5 F | BODY MASS INDEX: 21.34 KG/M2

## 2021-08-13 VITALS — DIASTOLIC BLOOD PRESSURE: 80 MMHG | SYSTOLIC BLOOD PRESSURE: 148 MMHG

## 2021-08-13 PROCEDURE — 82962 GLUCOSE BLOOD TEST: CPT | Mod: NC

## 2021-08-13 PROCEDURE — 36415 COLL VENOUS BLD VENIPUNCTURE: CPT

## 2021-08-13 PROCEDURE — 99214 OFFICE O/P EST MOD 30 MIN: CPT | Mod: 25

## 2021-08-13 NOTE — ASSESSMENT
[FreeTextEntry1] : Patient is a 45-year-old male with history of exophthalmia, type II diabetes hypertension hyperlipidemia/hypertriglyceridemia, thyroid nodule, nonalcoholic fatty liver vitamin D deficiency who presents for follow-up of hypertension high hyperlipidemia and type II diabetes\par \par 1. Type II diabetes\par continue metformin thousand BID random fingerstick 135\par check hemoglobin A1c last hemoglobin A1c 6.9\par consider adding jardiance for renal and heart protection\par \par 2. hypertension\par continue amlodipine mg mg and hydrochlorothiazide 12.5mg\par borderline control systolic elevated\par follow-up in two months\par \par 3. hyperlipidemia/hypertriglyceridemia\par continue fenofibrate eight and Crestor\par check lipid profile\par \par 4 history of thyroid nodule\par status post biopsy\par recheck sonogram\par \par 5 health maintenance\par schedule CPE\par mammogram order\par colonoscopy in the fall\par patient had COVID vaccine\par

## 2021-08-13 NOTE — HISTORY OF PRESENT ILLNESS
[FreeTextEntry1] : Follow-up for hypertension type II diabetes hyperlipidemia [de-identified] : Patient is a 45-year-old female with history of excel family, type II diabetes, hypertension, hyperlipidemia/hypertriglyceridemia, nonalcoholic fatty liver disease, thyroid nodules vitamin D deficiency who presents for follow-up patient feels well denies chest pain, shortness of breath distant exertion polyuria polydipsia ankle issues improved

## 2021-08-31 LAB
ANION GAP SERPL CALC-SCNC: 23 MMOL/L
BUN SERPL-MCNC: 16 MG/DL
CALCIUM SERPL-MCNC: 10.3 MG/DL
CHLORIDE SERPL-SCNC: 99 MMOL/L
CHOLEST SERPL-MCNC: 211 MG/DL
CO2 SERPL-SCNC: 22 MMOL/L
CREAT SERPL-MCNC: 0.64 MG/DL
ESTIMATED AVERAGE GLUCOSE: 148 MG/DL
GLUCOSE SERPL-MCNC: 139 MG/DL
HBA1C MFR BLD HPLC: 6.8 %
HDLC SERPL-MCNC: 50 MG/DL
LDLC SERPL CALC-MCNC: 136 MG/DL
NONHDLC SERPL-MCNC: 160 MG/DL
POTASSIUM SERPL-SCNC: 4.4 MMOL/L
SODIUM SERPL-SCNC: 144 MMOL/L
TRIGL SERPL-MCNC: 119 MG/DL

## 2021-10-22 ENCOUNTER — APPOINTMENT (OUTPATIENT)
Dept: INTERNAL MEDICINE | Facility: CLINIC | Age: 46
End: 2021-10-22

## 2022-01-21 ENCOUNTER — APPOINTMENT (OUTPATIENT)
Dept: INTERNAL MEDICINE | Facility: CLINIC | Age: 47
End: 2022-01-21

## 2022-04-11 PROBLEM — Z11.59 SCREENING FOR VIRAL DISEASE: Status: ACTIVE | Noted: 2020-07-10

## 2022-04-15 ENCOUNTER — NON-APPOINTMENT (OUTPATIENT)
Age: 47
End: 2022-04-15

## 2022-04-15 ENCOUNTER — APPOINTMENT (OUTPATIENT)
Dept: INTERNAL MEDICINE | Facility: CLINIC | Age: 47
End: 2022-04-15
Payer: COMMERCIAL

## 2022-04-15 VITALS
OXYGEN SATURATION: 99 % | WEIGHT: 135 LBS | HEART RATE: 87 BPM | TEMPERATURE: 96.98 F | BODY MASS INDEX: 23.05 KG/M2 | DIASTOLIC BLOOD PRESSURE: 72 MMHG | SYSTOLIC BLOOD PRESSURE: 138 MMHG | HEIGHT: 64 IN

## 2022-04-15 DIAGNOSIS — E11.9 TYPE 2 DIABETES MELLITUS W/OUT COMPLICATIONS: ICD-10-CM

## 2022-04-15 DIAGNOSIS — M54.9 DORSALGIA, UNSPECIFIED: ICD-10-CM

## 2022-04-15 DIAGNOSIS — K76.0 FATTY (CHANGE OF) LIVER, NOT ELSEWHERE CLASSIFIED: ICD-10-CM

## 2022-04-15 DIAGNOSIS — Z00.00 ENCOUNTER FOR GENERAL ADULT MEDICAL EXAMINATION W/OUT ABNORMAL FINDINGS: ICD-10-CM

## 2022-04-15 DIAGNOSIS — I10 ESSENTIAL (PRIMARY) HYPERTENSION: ICD-10-CM

## 2022-04-15 DIAGNOSIS — E04.2 NONTOXIC MULTINODULAR GOITER: ICD-10-CM

## 2022-04-15 DIAGNOSIS — E78.1 PURE HYPERGLYCERIDEMIA: ICD-10-CM

## 2022-04-15 PROCEDURE — 36415 COLL VENOUS BLD VENIPUNCTURE: CPT

## 2022-04-15 PROCEDURE — 99396 PREV VISIT EST AGE 40-64: CPT | Mod: 25

## 2022-04-15 PROCEDURE — 93000 ELECTROCARDIOGRAM COMPLETE: CPT

## 2022-04-15 RX ORDER — HYDROCHLOROTHIAZIDE 12.5 MG/1
12.5 TABLET ORAL
Qty: 90 | Refills: 5 | Status: ACTIVE | COMMUNITY
Start: 2017-06-16 | End: 1900-01-01

## 2022-04-15 NOTE — PHYSICAL EXAM
[Normal Sclera/Conjunctiva] : normal sclera/conjunctiva [PERRL] : pupils equal round and reactive to light [Normal Appearance] : normal in appearance [No Masses] : no palpable masses [No Nipple Discharge] : no nipple discharge [No Axillary Lymphadenopathy] : no axillary lymphadenopathy [Coordination Grossly Intact] : coordination grossly intact [No Focal Deficits] : no focal deficits [Normal] : affect was normal and insight and judgment were intact [de-identified] : exopthalmia [de-identified] : Positive straight leg raise on the right five no focal defect

## 2022-04-15 NOTE — HISTORY OF PRESENT ILLNESS
[FreeTextEntry1] : Comprehensive annual physical examination follow-up for hypertension type II diabetes hyperlipidemia and complaint of right sided lower back pain [de-identified] : The patient is a 46-year-old female with history of hypertension, type II diabetes hyperlipidemia thyroid nodules fatty liver vitamin D deficiency, Exppthalmia and chronic lower back pain who presents for comprehensive annual physical examination patient feels well denies polyuria polydipsia chest pain palpitation but complains of recent worsening of chronic lower back pain. She denies weight loss, fever, pain radiates down right leg relieved with Advil.

## 2022-04-15 NOTE — HEALTH RISK ASSESSMENT
[Very Good] : ~his/her~  mood as very good [Former] : Former [0-4] : 0-4 [Yes] : Yes [Monthly or less (1 pt)] : Monthly or less (1 point) [1 or 2 (0 pts)] : 1 or 2 (0 points) [Never (0 pts)] : Never (0 points) [0] : 2) Feeling down, depressed, or hopeless: Not at all (0) [PHQ-2 Negative - No further assessment needed] : PHQ-2 Negative - No further assessment needed [HIV test declined] : HIV test declined [Hepatitis C test declined] : Hepatitis C test declined [None] : None [Alone] : lives alone [Employed] : employed [College] : College [Single] : single [Feels Safe at Home] : Feels safe at home [Fully functional (bathing, dressing, toileting, transferring, walking, feeding)] : Fully functional (bathing, dressing, toileting, transferring, walking, feeding) [Fully functional (using the telephone, shopping, preparing meals, housekeeping, doing laundry, using] : Fully functional and needs no help or supervision to perform IADLs (using the telephone, shopping, preparing meals, housekeeping, doing laundry, using transportation, managing medications and managing finances) [Reports normal functional visual acuity (ie: able to read med bottle)] : Reports normal functional visual acuity [Smoke Detector] : smoke detector [Carbon Monoxide Detector] : carbon monoxide detector [Safety elements used in home] : safety elements used in home [Seat Belt] :  uses seat belt [Sunscreen] : uses sunscreen [de-identified] : Quit 10 years ago [Audit-CScore] : 1 [de-identified] : Walks [de-identified] : Healthy [[id number]] : 0 [Change in mental status noted] : No change in mental status noted [Language] : denies difficulty with language [Behavior] : denies difficulty with behavior [Learning/Retaining New Information] : denies difficulty learning/retaining new information [Handling Complex Tasks] : denies difficulty handling complex tasks [Reasoning] : denies difficulty with reasoning [Spatial Ability and Orientation] : denies difficulty with spatial ability and orientation [Sexually Active] : not sexually active [High Risk Behavior] : no high risk behavior [Reports changes in hearing] : Reports no changes in hearing [Reports changes in vision] : Reports no changes in vision [Reports changes in dental health] : Reports no changes in dental health [Guns at Home] : no guns at home [Travel to Developing Areas] : does not  travel to developing areas [TB Exposure] : is not being exposed to tuberculosis [Caregiver Concerns] : does not have caregiver concerns

## 2022-04-15 NOTE — PLAN
[FreeTextEntry1] : The patient is a 46-year-old female with history of type II diabetes hypertension hypertriglyceridemia, thyroid nodules, nonalcoholic fatty liver, who presents for comprehensive annual physical examination complains of right leg back pain\par \par 1 right sided back pain with radiation to the right leg\par most likely prior disc disease\par referral for physical therapy and Tylenol will observe\par \par 2.. Type II diabetes\par continue metformin 500 BID check hemoglobin A1c\par check urine for microalbumin\par referral to ophthalmology\par \par 3 hypertension\par controlled on amlodipine 5 mg once a day and hydrochlorothiazide 12.5\par \par 4. Hyperlipidemia hypertriglyceridemia\par continue fenofibrate eight 145 one tablet every day and Crestor 10 mg once a day\par \par 5 thyroid nodule\par check sonogram\par \par 6 history of fatty liver\par check sonogram check TFTs\par \par 7 health maintenance\par referral to ophthalmology\par check urine for microalbumin\par mammogram, sonogram of liver and thyroid sonogram\par check routine labs\par referral to [facility] for colonoscopy routine\par follow-up in 3 to 4 months\par \par 8 history of vitamin D deficiency\par patient should take supplement 400 units a day.\par \par

## 2022-05-06 LAB
ALBUMIN SERPL ELPH-MCNC: 5 G/DL
ALP BLD-CCNC: 74 U/L
ALT SERPL-CCNC: 23 U/L
ANION GAP SERPL CALC-SCNC: 15 MMOL/L
AST SERPL-CCNC: 23 U/L
BASOPHILS # BLD AUTO: 0.04 K/UL
BASOPHILS NFR BLD AUTO: 0.6 %
BILIRUB SERPL-MCNC: 0.3 MG/DL
BUN SERPL-MCNC: 15 MG/DL
CALCIUM SERPL-MCNC: 10 MG/DL
CHLORIDE SERPL-SCNC: 104 MMOL/L
CHOLEST SERPL-MCNC: 193 MG/DL
CO2 SERPL-SCNC: 20 MMOL/L
CREAT SERPL-MCNC: 0.64 MG/DL
CREAT SPEC-SCNC: 178 MG/DL
EGFR: 111 ML/MIN/1.73M2
EOSINOPHIL # BLD AUTO: 0.04 K/UL
EOSINOPHIL NFR BLD AUTO: 0.6 %
ESTIMATED AVERAGE GLUCOSE: 200 MG/DL
GLUCOSE SERPL-MCNC: 144 MG/DL
HBA1C MFR BLD HPLC: 8.6 %
HCT VFR BLD CALC: 38.5 %
HDLC SERPL-MCNC: 38 MG/DL
HGB BLD-MCNC: 11.8 G/DL
IMM GRANULOCYTES NFR BLD AUTO: 0.3 %
LDLC SERPL CALC-MCNC: 106 MG/DL
LYMPHOCYTES # BLD AUTO: 2.16 K/UL
LYMPHOCYTES NFR BLD AUTO: 33 %
MAN DIFF?: NORMAL
MCHC RBC-ENTMCNC: 26.8 PG
MCHC RBC-ENTMCNC: 30.6 GM/DL
MCV RBC AUTO: 87.5 FL
MICROALBUMIN 24H UR DL<=1MG/L-MCNC: 50.1 MG/DL
MICROALBUMIN/CREAT 24H UR-RTO: 281 MG/G
MONOCYTES # BLD AUTO: 0.39 K/UL
MONOCYTES NFR BLD AUTO: 6 %
NEUTROPHILS # BLD AUTO: 3.9 K/UL
NEUTROPHILS NFR BLD AUTO: 59.5 %
NONHDLC SERPL-MCNC: 155 MG/DL
PLATELET # BLD AUTO: 294 K/UL
POTASSIUM SERPL-SCNC: 4.3 MMOL/L
PROT SERPL-MCNC: 7.8 G/DL
RBC # BLD: 4.4 M/UL
RBC # FLD: 16.3 %
SODIUM SERPL-SCNC: 139 MMOL/L
TRIGL SERPL-MCNC: 245 MG/DL
TSH SERPL-ACNC: 1.9 UIU/ML
WBC # FLD AUTO: 6.55 K/UL

## 2022-07-19 ENCOUNTER — NON-APPOINTMENT (OUTPATIENT)
Age: 47
End: 2022-07-19

## 2022-07-29 ENCOUNTER — APPOINTMENT (OUTPATIENT)
Dept: INTERNAL MEDICINE | Facility: CLINIC | Age: 47
End: 2022-07-29

## 2022-07-29 VITALS
HEART RATE: 85 BPM | WEIGHT: 138 LBS | BODY MASS INDEX: 22.18 KG/M2 | TEMPERATURE: 96.1 F | OXYGEN SATURATION: 99 % | HEIGHT: 66 IN | DIASTOLIC BLOOD PRESSURE: 80 MMHG | SYSTOLIC BLOOD PRESSURE: 138 MMHG

## 2022-07-29 DIAGNOSIS — Z09 ENCOUNTER FOR FOLLOW-UP EXAMINATION AFTER COMPLETED TREATMENT FOR CONDITIONS OTHER THAN MALIGNANT NEOPLASM: ICD-10-CM

## 2022-07-29 DIAGNOSIS — Z76.0 ENCOUNTER FOR ISSUE OF REPEAT PRESCRIPTION: ICD-10-CM

## 2022-07-29 DIAGNOSIS — M25.531 PAIN IN RIGHT WRIST: ICD-10-CM

## 2022-07-29 PROCEDURE — 99214 OFFICE O/P EST MOD 30 MIN: CPT

## 2022-07-29 NOTE — HISTORY OF PRESENT ILLNESS
[FreeTextEntry1] : CTS, right wrist, since 2019.\par \par  [de-identified] : \par \par \par \par 46 Y O F, with PMH of: DM, HTN, high chol, presents for: follow-up visit for CTS (right wrist) (she had CTS since 2019).\par She went to: [facility], on around: 7/22/22, for the: R wrist pain. \par She was: Rx Motrin, for the CTS pain (at: [facility]).\par She is wearing a: right wrist brace, already.\par Her pain radiates from the: Right shoulder down to the right wrist.\par Pt types a lot, at her work.\par \par \par No other acute symptoms, reported today, except for the: right wrist pain.\par \par \par Regarding the: DM, HTN, and High Chol; pt indicates that: she is taking all: Rx as prescribed.\par \par \par

## 2022-07-29 NOTE — PLAN
[FreeTextEntry1] : \par \par \par \par \par 1) Right Wrist Pain\par Likely CTS, she had this: since 2019.\par Pt will do: Hand therapy.\par Follow-up with the: Hand Doctor, as we discussed.\par Rest/ Ice, the area.\par Use: Rx Motrin PRN.\par I ordered: MRI Cervical spine, to: R/O Cervical radiculopathy.\par \par \par \par 2) High Chol\par Lipid Panel ordered.\par Refilled: cholesterol medication today.\par Low fat diet, encouraged.\par \par \par \par \par 3) DM\par A1C ordered.\par Refilled: Rx Metformin today.\par Continue with: low sugar diet.\par \par \par \par \par \par I excused the pt, from: work today.\par \par Pt will follow-up with the PCP (Dr PEMBERTON), soon.\par \par All pt's questions answered.\par \par Pt okay with the plan.\par \par \par \par \par \par

## 2022-07-29 NOTE — PHYSICAL EXAM
[No Acute Distress] : no acute distress [Well Nourished] : well nourished [Well Developed] : well developed [Well-Appearing] : well-appearing [Normal Sclera/Conjunctiva] : normal sclera/conjunctiva [PERRL] : pupils equal round and reactive to light [EOMI] : extraocular movements intact [Normal Outer Ear/Nose] : the outer ears and nose were normal in appearance [Normal Oropharynx] : the oropharynx was normal [No JVD] : no jugular venous distention [No Lymphadenopathy] : no lymphadenopathy [Supple] : supple [No Respiratory Distress] : no respiratory distress  [No Accessory Muscle Use] : no accessory muscle use [Clear to Auscultation] : lungs were clear to auscultation bilaterally [Normal Rate] : normal rate  [Normal S1, S2] : normal S1 and S2 [No CVA Tenderness] : no CVA  tenderness [No Spinal Tenderness] : no spinal tenderness [No Rash] : no rash [No Skin Lesions] : no skin lesions [Coordination Grossly Intact] : coordination grossly intact [No Focal Deficits] : no focal deficits [Normal Gait] : normal gait [Speech Grossly Normal] : speech grossly normal [Normal Affect] : the affect was normal [Alert and Oriented x3] : oriented to person, place, and time [Normal Insight/Judgement] : insight and judgment were intact [de-identified] : Right Wrist Brace noted. Pt has: decreased ROM (mobility) of the right wrist noted, due to: CTS Hx. [de-identified] : Pleasant.

## 2022-08-01 LAB
ALBUMIN SERPL ELPH-MCNC: 5 G/DL
ALP BLD-CCNC: 73 U/L
ALT SERPL-CCNC: 25 U/L
ANION GAP SERPL CALC-SCNC: 17 MMOL/L
AST SERPL-CCNC: 24 U/L
BILIRUB SERPL-MCNC: 0.2 MG/DL
BUN SERPL-MCNC: 14 MG/DL
CALCIUM SERPL-MCNC: 10.2 MG/DL
CHLORIDE SERPL-SCNC: 104 MMOL/L
CHOLEST SERPL-MCNC: 228 MG/DL
CO2 SERPL-SCNC: 19 MMOL/L
CREAT SERPL-MCNC: 0.62 MG/DL
EGFR: 112 ML/MIN/1.73M2
ESTIMATED AVERAGE GLUCOSE: 203 MG/DL
GLUCOSE SERPL-MCNC: 93 MG/DL
HBA1C MFR BLD HPLC: 8.7 %
HDLC SERPL-MCNC: 43 MG/DL
LDLC SERPL CALC-MCNC: 146 MG/DL
NONHDLC SERPL-MCNC: 185 MG/DL
POTASSIUM SERPL-SCNC: 4.1 MMOL/L
PROT SERPL-MCNC: 7.9 G/DL
SODIUM SERPL-SCNC: 139 MMOL/L
TRIGL SERPL-MCNC: 197 MG/DL

## 2022-08-02 ENCOUNTER — APPOINTMENT (OUTPATIENT)
Dept: ORTHOPEDIC SURGERY | Facility: CLINIC | Age: 47
End: 2022-08-02

## 2022-08-02 VITALS — HEIGHT: 66 IN | BODY MASS INDEX: 22.18 KG/M2 | WEIGHT: 138 LBS | RESPIRATION RATE: 16 BRPM

## 2022-08-02 PROCEDURE — 73110 X-RAY EXAM OF WRIST: CPT | Mod: LT

## 2022-08-02 PROCEDURE — 99203 OFFICE O/P NEW LOW 30 MIN: CPT

## 2022-08-02 PROCEDURE — 73070 X-RAY EXAM OF ELBOW: CPT | Mod: RT

## 2022-08-03 ENCOUNTER — APPOINTMENT (OUTPATIENT)
Dept: PHYSICAL MEDICINE AND REHAB | Facility: CLINIC | Age: 47
End: 2022-08-03

## 2022-08-03 VITALS — BODY MASS INDEX: 22.18 KG/M2 | WEIGHT: 138 LBS | HEIGHT: 66 IN

## 2022-08-03 DIAGNOSIS — G56.01 CARPAL TUNNEL SYNDROME, RIGHT UPPER LIMB: ICD-10-CM

## 2022-08-03 DIAGNOSIS — M79.641 PAIN IN RIGHT HAND: ICD-10-CM

## 2022-08-03 DIAGNOSIS — G89.29 PAIN IN RIGHT HAND: ICD-10-CM

## 2022-08-03 DIAGNOSIS — R20.0 ANESTHESIA OF SKIN: ICD-10-CM

## 2022-08-03 DIAGNOSIS — R29.898 OTHER SYMPTOMS AND SIGNS INVOLVING THE MUSCULOSKELETAL SYSTEM: ICD-10-CM

## 2022-08-03 PROCEDURE — 99204 OFFICE O/P NEW MOD 45 MIN: CPT | Mod: 95

## 2022-08-03 NOTE — PHYSICAL EXAM
[Normal] : The appearance of the neck was normal, no neck mass was observed, the thyroid was not enlarged and there were no palpable thyroid nodules [de-identified] : no wasting

## 2022-08-03 NOTE — CONSULT LETTER
[FreeTextEntry1] : Dear Dr. VAUGHN BUTTERFIELD  , \par \par I had the pleasure of evaluating your patient, TRELL VERGARA .\par \par Thank you very much for allowing me to participate in the care of this patient. If you have any questions, please do not hesitate to contact me. \par \par Sincerely, \par Hang Michael MD \par \par ABPMR Board Certified in Physical Medicine and Rehabilitation\par Certified Fellow of AANEM (Neuromuscular and Electrodiagnostic Medicine)\par Subspecialty certified in Sports Medicine (ABPMR)\par \par  of Physical Medicine and Rehabilitation\par [facility] [employer] [facility]\par [facility]\par \par

## 2022-08-03 NOTE — HISTORY OF PRESENT ILLNESS
[Home] : at home, [unfilled] , at the time of the visit. [Medical Office: ([facility])___] : at the medical office located in  [Verbal consent obtained from patient] : the patient, [unfilled] [FreeTextEntry1] : Ms. TRELL VERGARA is a very pleasant 46 year RHD  female who seen for evaluation of R>L hand numbness and weakness   that has been ongoing for 3 years  without any specific injury or inciting event.\par she types all day long works for [employer] \par she has PMH thyroid dz  The pain is located primarily all fingers  intermittent in nature and described as constant numbness. The pain is rated as 6/10 during today's visit, and ranges from 6-9/10. The patient's symptoms are aggravated by anything  and alleviated by nothing -she was given script for splints \par she is dropping thing s a lot such as pens slip out of her hands  . The patient denies any other  night pain, feet numbness/tingling, weakness, or bowel/bladder dysfunction. The patient has no other complaints at this time.\par she has some neck pain too \par \par she takes metformin for mOD \par

## 2022-08-03 NOTE — REVIEW OF SYSTEMS
[Muscle Weakness] : muscle weakness [Negative] : Heme/Lymph [Fever] : no fever [Recent Change In Weight] : ~T no recent weight change [Lower Ext Edema] : no lower extremity edema [Difficulty Walking] : no difficulty walking

## 2022-08-03 NOTE — ASSESSMENT
[FreeTextEntry1] : \par PLAN AND RECOMMENDATIONS :\par \par We discussed differential diagnosis and clinical impression\par Dr Archibald feels she may have cubital as well as carpal tunnel syndrome -\par agree with EMG \par \par Recommend\par .symptomatic care and support try braces at night or at work \par  medications NSAIDS as needed -  OTC fine (-personal preference )-(once or twice a day), -warned of  possible GI side effects -advised to take with meals or add over the counter Nexium, if sensitive\par \par \par  imaging done \par \par  hydrotherapy /heat / cold for pain\par  continue  ergonomic precautions including pacing ,posture and frequent breaks while typing.\par \par  relative rest and avoidance of painful activity where possible \par  increasing activity as discussed \par  return for EMG\par Information given to patient about EMG and Nerve Conduction Study Examination including  planning, differential diagnosis to rule in /rule out ,duration of the test ,precautions (if patient on blood thinner.has bleeding disorder or  pace maker device etc -still possible to undergo with care), side effects(benign-limited to short term bruising and discomfort/pain)  \par The protocol of temp checks upon arrival ,disinfection procedure of waiting room and the lab explained- reassured. \par All questions answered. \par Patient instructed to book appointment upon conclusion of appointment\par \par Information sheet ' Answers to your Questions on EMG " forwarded to patient to read prior to testing, with further information about training,background and the procedure itself .\par The patient had the opportunity to ask questions and all were answered to their satisfaction. We will coordinate treatment with the other members of the treatment team.\par The patient verbalized understanding of the management/plan rationale and agreed with my recommendations.\par Total clinician time on the date of this encounter was at least 45 minutes- including\par \par 1 preparing to see the patient and review of prior notes, tests and other records \par 2 obtaining and reviewing and/ or confirming the history\par 3 performing a medically necessary, pertinent  and appropriate examination \par 4 ordering medications and supplements explaining side effects to look for ,tests,procedures,therapy and or specialty referrals\par 5 explaining the diagnosis or differential to the patient \par 6 communicating with other physicians or providers before during or after the visit.will confer with Dr CARREON after study \par \par \par

## 2022-08-10 ENCOUNTER — APPOINTMENT (OUTPATIENT)
Dept: PHYSICAL MEDICINE AND REHAB | Facility: CLINIC | Age: 47
End: 2022-08-10

## 2022-08-26 ENCOUNTER — APPOINTMENT (OUTPATIENT)
Dept: INTERNAL MEDICINE | Facility: CLINIC | Age: 47
End: 2022-08-26

## 2022-09-09 ENCOUNTER — LABORATORY RESULT (OUTPATIENT)
Age: 47
End: 2022-09-09

## 2022-09-09 ENCOUNTER — APPOINTMENT (OUTPATIENT)
Dept: INTERNAL MEDICINE | Facility: CLINIC | Age: 47
End: 2022-09-09

## 2022-09-09 VITALS
OXYGEN SATURATION: 100 % | HEIGHT: 66 IN | TEMPERATURE: 98.2 F | DIASTOLIC BLOOD PRESSURE: 83 MMHG | WEIGHT: 135 LBS | HEART RATE: 98 BPM | BODY MASS INDEX: 21.69 KG/M2 | SYSTOLIC BLOOD PRESSURE: 135 MMHG

## 2022-09-09 DIAGNOSIS — Z87.39 PERSONAL HISTORY OF OTHER DISEASES OF THE MUSCULOSKELETAL SYSTEM AND CONNECTIVE TISSUE: ICD-10-CM

## 2022-09-09 PROCEDURE — 99214 OFFICE O/P EST MOD 30 MIN: CPT

## 2022-09-09 RX ORDER — FENOFIBRATE 145 MG/1
145 TABLET, COATED ORAL
Qty: 90 | Refills: 1 | Status: ACTIVE | COMMUNITY
Start: 2017-07-07 | End: 1900-01-01

## 2022-09-09 RX ORDER — NAPROXEN 500 MG/1
500 TABLET ORAL
Qty: 20 | Refills: 1 | Status: ACTIVE | COMMUNITY
Start: 2022-09-09 | End: 1900-01-01

## 2022-09-09 NOTE — PLAN
[FreeTextEntry1] : \par \par \par 1) Right Wrist Pain\par CTS, she had this: since 2019.\par Pt will do: Hand therapy.\par Follow-up with the: Hand Doctor, as we discussed.\par Rest/ Ice, the area.\par Use: Rx Naproxen PRN.\par Pt has EMG scheduled already.\par \par \par 2) High Chol\par Lipid Panel ordered.\par Low fat diet, encouraged.\par \par \par 3) DM\par A1C ordered.\par Continue with: low sugar diet.\par \par \par 4) Chronic Hx of Right Foot Drop\par Pt wants to see a: Neurologist. (See the referral order).\par \par \par \par I excused the pt, from: work today.\par \par Pt will follow-up with the PCP (Dr PEMBERTON), soon.\par \par All pt's questions answered.\par \par Pt okay with the plan.\par \par \par \par

## 2022-09-09 NOTE — HISTORY OF PRESENT ILLNESS
[FreeTextEntry1] : CTS, right wrist, since 2019.\par \par \par  [de-identified] : \par 46 Y O F, with PMH of: DM, HTN, high chol, presents for: follow-up visit for CTS (right wrist) (she had CTS since 2019).\par EMG is already scheduled for: 9/12/22 at [facility] ([place]).\par Pt types a lot, at her work.\par Pt needs Rx refills today for: Fenofibrate, Naproxen, and Metformin.\par In addition, pt is c/o: long-term Hx of Right Foot Drop. Pt wants to see a: [facility] Neurologist. \par \par

## 2022-09-09 NOTE — REVIEW OF SYSTEMS
[Negative] : Heme/Lymph [FreeTextEntry9] : + Right Wrist Pain, from CTS Hx (since 2019).   [de-identified] : +  long-term Hx of Right Foot Drop

## 2022-09-09 NOTE — PHYSICAL EXAM
[No Acute Distress] : no acute distress [Well Nourished] : well nourished [Well Developed] : well developed [Well-Appearing] : well-appearing [Normal Sclera/Conjunctiva] : normal sclera/conjunctiva [PERRL] : pupils equal round and reactive to light [EOMI] : extraocular movements intact [Normal Outer Ear/Nose] : the outer ears and nose were normal in appearance [Normal Oropharynx] : the oropharynx was normal [No JVD] : no jugular venous distention [Supple] : supple [No Respiratory Distress] : no respiratory distress  [No Accessory Muscle Use] : no accessory muscle use [Clear to Auscultation] : lungs were clear to auscultation bilaterally [Normal Rate] : normal rate  [Normal S1, S2] : normal S1 and S2 [No CVA Tenderness] : no CVA  tenderness [No Spinal Tenderness] : no spinal tenderness [No Rash] : no rash [No Skin Lesions] : no skin lesions [Coordination Grossly Intact] : coordination grossly intact [No Focal Deficits] : no focal deficits [Normal Gait] : normal gait [Speech Grossly Normal] : speech grossly normal [Normal Affect] : the affect was normal [Alert and Oriented x3] : oriented to person, place, and time [Normal Insight/Judgement] : insight and judgment were intact [de-identified] : Pt has: decreased ROM (mobility) of the right wrist noted, due to: CTS Hx.

## 2022-09-12 RX ORDER — SITAGLIPTIN 100 MG/1
100 TABLET, FILM COATED ORAL DAILY
Qty: 90 | Refills: 1 | Status: ACTIVE | COMMUNITY
Start: 2022-09-12 | End: 1900-01-01

## 2022-09-13 LAB
ALBUMIN SERPL ELPH-MCNC: 4.7 G/DL
ALP BLD-CCNC: 86 U/L
ALT SERPL-CCNC: 28 U/L
ANION GAP SERPL CALC-SCNC: 15 MMOL/L
AST SERPL-CCNC: 23 U/L
BILIRUB SERPL-MCNC: 0.2 MG/DL
BUN SERPL-MCNC: 28 MG/DL
CALCIUM SERPL-MCNC: 10.2 MG/DL
CHLORIDE SERPL-SCNC: 103 MMOL/L
CHOLEST SERPL-MCNC: 248 MG/DL
CO2 SERPL-SCNC: 21 MMOL/L
CREAT SERPL-MCNC: 1.08 MG/DL
EGFR: 65 ML/MIN/1.73M2
ESTIMATED AVERAGE GLUCOSE: 214 MG/DL
GLUCOSE SERPL-MCNC: 215 MG/DL
HBA1C MFR BLD HPLC: 9.1 %
HDLC SERPL-MCNC: 32 MG/DL
LDLC SERPL CALC-MCNC: NORMAL MG/DL
NONHDLC SERPL-MCNC: 216 MG/DL
POTASSIUM SERPL-SCNC: 4.8 MMOL/L
PROT SERPL-MCNC: 7.9 G/DL
SODIUM SERPL-SCNC: 139 MMOL/L
TRIGL SERPL-MCNC: 875 MG/DL

## 2022-09-13 RX ORDER — ROSUVASTATIN CALCIUM 10 MG/1
10 TABLET, FILM COATED ORAL
Qty: 90 | Refills: 2 | Status: ACTIVE | COMMUNITY
Start: 2020-07-24 | End: 1900-01-01

## 2022-09-21 ENCOUNTER — NON-APPOINTMENT (OUTPATIENT)
Age: 47
End: 2022-09-21

## 2022-09-21 ENCOUNTER — TRANSCRIPTION ENCOUNTER (OUTPATIENT)
Age: 47
End: 2022-09-21

## 2022-10-14 ENCOUNTER — APPOINTMENT (OUTPATIENT)
Dept: INTERNAL MEDICINE | Facility: CLINIC | Age: 47
End: 2022-10-14

## 2023-01-25 ENCOUNTER — APPOINTMENT (OUTPATIENT)
Age: 48
End: 2023-01-25

## 2024-09-27 ENCOUNTER — APPOINTMENT (OUTPATIENT)
Dept: INTERNAL MEDICINE | Facility: CLINIC | Age: 49
End: 2024-09-27